# Patient Record
Sex: MALE | Race: WHITE
[De-identification: names, ages, dates, MRNs, and addresses within clinical notes are randomized per-mention and may not be internally consistent; named-entity substitution may affect disease eponyms.]

---

## 2020-09-06 ENCOUNTER — HOSPITAL ENCOUNTER (EMERGENCY)
Dept: HOSPITAL 75 - ER | Age: 35
Discharge: HOME | End: 2020-09-06
Payer: COMMERCIAL

## 2020-09-06 VITALS — DIASTOLIC BLOOD PRESSURE: 83 MMHG | SYSTOLIC BLOOD PRESSURE: 119 MMHG

## 2020-09-06 VITALS — HEIGHT: 71.65 IN | BODY MASS INDEX: 31.4 KG/M2 | WEIGHT: 229.28 LBS

## 2020-09-06 DIAGNOSIS — J30.2: Primary | ICD-10-CM

## 2020-09-06 PROCEDURE — 99284 EMERGENCY DEPT VISIT MOD MDM: CPT

## 2020-09-06 NOTE — ED RESPIRATORY
General


Chief Complaint:  Cough/Cold/Flu Symptoms


Stated Complaint:  COUGH / CONGESTION


Source:  patient


Exam Limitations:  no limitations





History of Present Illness


Date Seen by Provider:  Sep 6, 2020


Time Seen by Provider:  16:06


Initial Comments


the patient presents to the ER by private conveyance from home with chief 

complaint of upper airway congestion without discharge. He's been using Zyrtec 

and Flonase like normal without significant improvement in his symptoms. The 

humidity has been awful hard on his allergies he states. He has a history of 

asthma but has not been needing his albuterol inhaler any more than usual. He is

on Symbicort. He would like a steroid shot for his allergies. No fever chills 

diarrhea loss of sense of smell or taste.





Allergies and Home Medications


Allergies


Coded Allergies:  


     No Known Drug Allergies (Unverified , 9/6/20)





Home Medications


Albuterol 17 Gm Inh, 1 SPRAY IH UD, (Reported)


Promethazine/Codeine 5 Ml Syrp, 0 PO Q4H


   5 - 10 ML 


   Prescribed by: ROLANDA PIERSON on 12/25/12 1238





Patient Home Medication List


Home Medication List Reviewed:  Yes





Review of Systems


Review of Systems


Constitutional:  No chills, No fever


EENTM:  nose congestion; No ear discharge, No ear pain, No nose pain, No throat 

pain, No throat swelling


Respiratory:  No cough, No phlegm, No short of breath


Cardiovascular:  No chest pain, No palpitations


Gastrointestinal:  No abdominal pain, No melena, No nausea


Genitourinary:  No discharge, No dysuria


Musculoskeletal:  No back pain, No joint pain





All Other Systems Reviewed


Negative Unless Noted:  Yes





Past Medical-Social-Family Hx


Patient Social History


Alcohol Use:  Denies Use


Recreational Drug Use:  No


Type Used:  Smokeless Tobacco


Recent Foreign Travel:  No


Contact w/Someone Who Travel:  No





Physical Exam





Vital Signs - First Documented








 9/6/20





 16:16


 


O2 Delivery Room Air





Capillary Refill :


Height: '"


Weight: lbs. oz. kg;  BMI


Method:Stated


General Appearance:  WD/WN, no apparent distress


Eyes:  Bilateral Eye Normal Inspection, Bilateral Eye PERRL, Bilateral Eye EOMI


HEENT:  PERRL/EOMI, normal ENT inspection, TMs normal, pharynx normal, other (no

sinus tenderness. Blue tinted nasal mucosa. Oral pharynx without injection or 

erythema.)


Neck:  full range of motion, supple, normal inspection


Respiratory:  lungs clear, normal breath sounds, no respiratory distress, no 

accessory muscle use


Cardiovascular:  normal peripheral pulses, regular rate, rhythm


Neurologic/Psychiatric:  alert, normal mood/affect, oriented x 3


Skin:  normal color, warm/dry





Progress/Results/Core Measures


Suspected Sepsis


SIRS


Temperature: 


Pulse:  


Respiratory Rate: 


 


Blood Pressure  / 


Mean:





Results/Orders


My Orders





Orders - KRYSTAL FARIAS


Methylprednisolone Acetate Inj (Depo-Med (9/6/20 16:30)





Vital Signs/I&O











 9/6/20





 16:16


 


O2 Delivery Room Air





Capillary Refill :


Progress Note :  


   Time:  16:20


Progress Note


Aseptic vital signs with upper airway congestion likely allergic in nature. 

Depo-Medrol 40 mg IM times one.





Departure


Impression





   Primary Impression:  


   Allergic rhinitis


   Qualified Codes:  J30.2 - Other seasonal allergic rhinitis


Disposition:  01 HOME, SELF-CARE


Condition:  Stable





Departure-Patient Inst.


Decision time for Depature:  16:21


Referrals:  


NO,LOCAL PHYSICIAN (PCP/Family)


Primary Care Physician


Patient Instructions:  Seasonal Allergies (DC)





Add. Discharge Instructions:  


Continue your Zyrtec and Flonase.


Consider adding a decongestant such as Sudafed, chlorpheniramine or 

oxymetazoline/Afrin spray.


The steroid should kick in in about 12 hours and lasts for about 5-7 days.


It may cause increased feelings of energy and difficulty getting to sleep at 

night but this is temporary. You may use one to 2 tablets of Benadryl to help to

get to sleep at night if this occurs.


All discharge instructions reviewed with patient and/or family. Voiced 

understanding.











KRYSTAL FARIAS                  Sep 6, 2020 16:22

## 2021-01-10 ENCOUNTER — HOSPITAL ENCOUNTER (EMERGENCY)
Dept: HOSPITAL 75 - ER | Age: 36
Discharge: HOME | End: 2021-01-10
Payer: COMMERCIAL

## 2021-01-10 VITALS — SYSTOLIC BLOOD PRESSURE: 131 MMHG | DIASTOLIC BLOOD PRESSURE: 89 MMHG

## 2021-01-10 DIAGNOSIS — Z20.828: Primary | ICD-10-CM

## 2021-01-10 DIAGNOSIS — J45.909: ICD-10-CM

## 2021-01-10 PROCEDURE — 99283 EMERGENCY DEPT VISIT LOW MDM: CPT

## 2021-01-10 PROCEDURE — 71045 X-RAY EXAM CHEST 1 VIEW: CPT

## 2021-01-10 PROCEDURE — 87804 INFLUENZA ASSAY W/OPTIC: CPT

## 2021-01-10 PROCEDURE — 87635 SARS-COV-2 COVID-19 AMP PRB: CPT

## 2021-01-10 PROCEDURE — 87430 STREP A AG IA: CPT

## 2021-01-10 NOTE — DIAGNOSTIC IMAGING REPORT
INDICATION: Cough and sore throat.



EXAMINATION: Frontal chest was obtained at 8:21 p.m.



COMPARISON: 12/25/2012.



FINDINGS: Heart and mediastinal silhouette are normal in

appearance. The lungs are clear. There is no pneumothorax or

pleural fluid.



IMPRESSION: No acute process in the chest. 



Dictated by: 



  Dictated on workstation # [id number]

## 2021-01-10 NOTE — ED COUGH/URI
General


Stated Complaint:  COUGH,SORE THROAT


Source:  patient





History of Present Illness


Date Seen by Provider:  Filemon 10, 2021


Time Seen by Provider:  19:51


Initial Comments


PT ARRIVES VIA POV


C/O SORE THROAT


C/O NON-PRODUCTIVE COUGH--STATES IT IS GETTING DEEPER AND HE HAS HAD SOME 

WHEEZING


IS VAGUE ABOUT ONSET, BUT STATES SYMPTOMS WORSE SINCE YESTERDAY


NO KNOWN FEVER


NO ACTUAL SHORTNESS OF BREATH


NO LOSS OF TASTE OR SMELL


NO GI SYMPTOMS


NO HEADACHE


NO BODY ACHES


PT HAS HISTORY OF ASTHMA AND ALLERGIES--DOES NOT TAKE ANYTHING FOR SYMPTOMS. HAS

OLD ALBUTEROL INHALER, BUT HAS NOT USED IT 


HAS NOT TAKEN ANYTHING FOR SYMPTOMS AT ANY TIME





PT IS 


LIVES ALONE


NO KNOWN SICK CONTACTS OR KNOWN EXPOSURE TO COVID-19





PCP: NONE





Allergies and Home Medications


Allergies


Coded Allergies:  


     No Known Drug Allergies (Unverified , 9/6/20)





Home Medications


Albuterol 17 Gm Inh, 1 SPRAY IH UD, (Reported)


Azithromycin 500 Mg Tablet, 500 MG PO DAILY


   Prescribed by: LIZETTE GRIJALVA on 1/10/21 2100


Dexamethasone 6 Mg Tablet, 6 MG PO DAILY


   Prescribed by: LIZETTE GRIJALVA on 1/10/21 2100


Promethazine/Codeine 5 Ml Syrp, 0 PO Q4H


   5 - 10 ML 


   Prescribed by: ROLANDA PIERSON on 12/25/12 1238





Patient Home Medication List


Home Medication List Reviewed:  Yes





Review of Systems


Review of Systems


Constitutional:  no symptoms reported; No chills, No diaphoresis, No dizziness, 

No fever, No malaise, No weakness


EENTM:  see HPI, throat pain; No nose congestion


Respiratory:  see HPI, cough, wheezing


Cardiovascular:  no symptoms reported


Gastrointestinal:  no symptoms reported; No diarrhea, No loss of appetite, No 

nausea, No vomiting


Genitourinary:  no symptoms reported


Musculoskeletal:  no symptoms reported


Skin:  no symptoms reported


Psychiatric/Neurological:  No Symptoms Reported; Denies Headache


Hematologic/Lymphatic:  No Symptoms Reported


Immunological/Allergic:  no symptoms reported





Past Medical-Social-Family Hx


Past Med/Social Hx:  Reviewed and Corrections made


Patient Social History


Alcohol Use:  Occasionally Uses


Recreational Drug Use:  No


Type Used:  Smokeless Tobacco


Recent Foreign Travel:  No


Contact w/Someone Who Travel:  No


Recent Hopitalizations:  No





Seasonal Allergies


Seasonal Allergies:  Yes





Past Medical History


Surgeries:  Yes (hernia)


Abdominal, Orthopedic


Respiratory:  Yes


Asthma


Cardiac:  No


Neurological:  No


Genitourinary:  No


Gastrointestinal:  Yes


Abdominal Hernia


Musculoskeletal:  No


Endocrine:  No


HEENT:  No


Cancer:  No


Psychosocial:  No


Integumentary:  No


Blood Disorders:  No





Physical Exam





Vital Signs - First Documented








 1/10/21 1/10/21 1/10/21





 19:40 20:16 21:16


 


Temp 37.4  


 


Pulse 104  


 


Resp 16  


 


B/P (MAP) 145/109 (121)  


 


Pulse Ox   100


 


O2 Delivery  Room Air 





Capillary Refill :


Height: '"


Weight: lbs. oz. kg; 31.00 BMI


Method:Stated


General Appearance:  WD/WN, no apparent distress, other (DOES NOT APPEAR ILL OR 

TO BE IN ANY DISCOMFORT OR DISTRESS)


HEENT:  PERRL/EOMI, normal ENT inspection, TMs normal, pharynx normal, other 

(NOSE NORMAL. NO SINUS TENDERNESS)


Neck:  non-tender, full range of motion, supple, normal inspection; No 

lymphadenopathy (R), No lymphadenopathy (L)


Respiratory:  normal breath sounds, no respiratory distress, no accessory muscle

use


Cardiovascular:  regular rate, rhythm, no murmur


Gastrointestinal:  soft


Extremities:  normal inspection


Neurologic/Psychiatric:  CNs II-XII nml as tested, no motor/sensory deficits, 

alert, normal mood/affect, oriented x 3


Skin:  normal color, warm/dry





Progress/Results/Core Measures


Suspected Sepsis


SIRS


Temperature: 


Pulse:  


Respiratory Rate: 


 


Blood Pressure  / 


Mean:





Results/Orders


Lab Results





Laboratory Tests








Test


 1/10/21


20:05 Range/Units


 


 


Coronavirus 2019 (LEONIDAS) Negative  Negative  


 


Group A Streptococcus Screen NEGATIVE  NEGATIVE  








Micro Results





Microbiology


1/10/21 Influenza Types A,B Antigen (GISSELL) - Final, Complete


          





My Orders





Orders - LIZETTE GRIJALVA DO


Rapid Strep A Screen (1/10/21 19:49)


Coronavirus Sars-Cov-2 So 2019 (1/10/21 19:49)


Covid 19 Inhouse Test (1/10/21 19:49)


Influenza A And B Antigens (1/10/21 19:49)


Chest 1 View, Ap/Pa Only (1/10/21 19:51)


Dexamethasone Tablet (Decadron Tablet) (1/10/21 21:00)


Azithromycin Tablet (Zithromax Tablet) (1/10/21 21:00)


Albuterol/Ipratropium Inhaler (Combivent (1/10/21 21:00)


Rt Request For Service (1/10/21 20:55)





Medications Given in ED





Current Medications








 Medications  Dose


 Ordered  Sig/Jennifer


 Route  Start Time


 Stop Time Status Last Admin


Dose Admin


 


 Albuterol/


 Ipratropium  1 PUFF  QID  PRN


 IH  1/10/21 21:00


 1/10/21 21:17 DC 1/10/21 21:09


4 GM


 


 Azithromycin  500 mg  ONCE  ONCE


 PO  1/10/21 21:00


 1/10/21 21:01 DC 1/10/21 21:09


500 MG








Vital Signs/I&O











 1/10/21 1/10/21 1/10/21





 19:40 20:16 21:16


 


Temp 37.4  37.4


 


Pulse 104  98


 


Resp 16  16


 


B/P (MAP) 145/109 (121)  131/89 (121)


 


Pulse Ox   100


 


O2 Delivery  Room Air Room Air





Capillary Refill :


Progress Note :  


Progress Note


PLACED IN ISOLATION ROOM


PPE WORN AT ALL TIMES


COVID-19 TESTING PERFORMED


PT ADVISED OF NEED FOR QUARANTINE





Diagnostic Imaging





Comments


CXR--PER RADIOLOGIST REPORT AT 2100


FINDINGS: Heart and mediastinal silhouette are normal in


appearance. The lungs are clear. There is no pneumothorax or


pleural fluid.





IMPRESSION: No acute process in the chest.


   Reviewed:  Reviewed by Me





Departure


Impression





   Primary Impression:  


   Person under investigation for COVID-19


Disposition:  01 HOME, SELF-CARE


Condition:  Stable





Departure-Patient Inst.


Referrals:  


NO,LOCAL PHYSICIAN (PCP/Family)


Primary Care Physician


Patient Instructions:  Coronavirus Disease 2019 (COVID-19) (DC), Preventing the 

Spread of an Infectious Disease





Add. Discharge Instructions:  


LOTS OF CLEAR LIQUIDS--WATER, BROTH, JELLO, GATORADE





TYLENOL 1 GRAM/ MOTRIN 800 MG 4 TIMES A DAY AS NEEDED FOR PAIN OR FEVER





OVER THE COUNTER MEDICATIONS SUCH AS MUCINEX DM FOR COUGH





USE INHALER 2 PUFFS EVERY 4 HOURS AS NEEDED FOR BREATHING





FOLLOW UP WITH  OF CHOICE IN 4-5 DAYS IF NO BETTER--, RETURN TO ER IF WORSE


IF YOU ARE STILL HAVING SYMPTOMS, YOU MAY NEED TO BE RE-TESTED FOR COVID-19 AT 

THAT TIME





QUARANTINE YOURSELF AND ALL CLOSE CONTACTS FOR THE NEXT 2 WEEKS OR UNTIL CLEARED

BY  OR HEALTH DEPT


Scripts


Azithromycin (Zithromax) 500 Mg Tablet


500 MG PO DAILY for 5 Days, #5 TAB


   Prov: LIZETTE GRIJALVA DO         1/10/21 


Dexamethasone (Decadron) 6 Mg Tablet


6 MG PO DAILY, #10 TAB


   Prov: LIZETTE GRIJALVA DO         1/10/21


Work/School Note:  Local Medical Staff Listing, Work Release Form   Date Seen in

the Emergency Department:  Filemon 10, 2021


   Return to Work:  Jan 24, 2021


   Restrictions:  Need Release from Doctor











LIZETTE GRIJALVA DO                 Filemon 10, 2021 19:53

## 2022-01-02 ENCOUNTER — HOSPITAL ENCOUNTER (EMERGENCY)
Dept: HOSPITAL 75 - ER | Age: 37
Discharge: HOME | End: 2022-01-02
Payer: COMMERCIAL

## 2022-01-02 VITALS — DIASTOLIC BLOOD PRESSURE: 78 MMHG | SYSTOLIC BLOOD PRESSURE: 121 MMHG

## 2022-01-02 VITALS — BODY MASS INDEX: 34.47 KG/M2 | HEIGHT: 69.69 IN | WEIGHT: 238.1 LBS

## 2022-01-02 DIAGNOSIS — J45.909: Primary | ICD-10-CM

## 2022-01-02 DIAGNOSIS — Z20.822: ICD-10-CM

## 2022-01-02 LAB
ALBUMIN SERPL-MCNC: 4.3 GM/DL (ref 3.2–4.5)
ALP SERPL-CCNC: 57 U/L (ref 40–136)
ALT SERPL-CCNC: 29 U/L (ref 0–55)
BASOPHILS # BLD AUTO: 0 10^3/UL (ref 0–0.1)
BASOPHILS NFR BLD AUTO: 0 % (ref 0–10)
BILIRUB SERPL-MCNC: 0.7 MG/DL (ref 0.1–1)
BUN/CREAT SERPL: 10
CALCIUM SERPL-MCNC: 9.1 MG/DL (ref 8.5–10.1)
CHLORIDE SERPL-SCNC: 108 MMOL/L (ref 98–107)
CO2 SERPL-SCNC: 21 MMOL/L (ref 21–32)
CREAT SERPL-MCNC: 0.79 MG/DL (ref 0.6–1.3)
EOSINOPHIL # BLD AUTO: 0.1 10^3/UL (ref 0–0.3)
EOSINOPHIL NFR BLD AUTO: 1 % (ref 0–10)
GFR SERPLBLD BASED ON 1.73 SQ M-ARVRAT: 110 ML/MIN
GLUCOSE SERPL-MCNC: 98 MG/DL (ref 70–105)
HCT VFR BLD CALC: 52 % (ref 40–54)
HGB BLD-MCNC: 17.1 G/DL (ref 13.3–17.7)
LYMPHOCYTES # BLD AUTO: 2.6 10^3/UL (ref 1–4)
LYMPHOCYTES NFR BLD AUTO: 24 % (ref 12–44)
MANUAL DIFFERENTIAL PERFORMED BLD QL: NO
MCH RBC QN AUTO: 30 PG (ref 25–34)
MCHC RBC AUTO-ENTMCNC: 33 G/DL (ref 32–36)
MCV RBC AUTO: 90 FL (ref 80–99)
MONOCYTES # BLD AUTO: 0.8 10^3/UL (ref 0–1)
MONOCYTES NFR BLD AUTO: 7 % (ref 0–12)
NEUTROPHILS # BLD AUTO: 7.3 10^3/UL (ref 1.8–7.8)
NEUTROPHILS NFR BLD AUTO: 67 % (ref 42–75)
PLATELET # BLD: 310 10^3/UL (ref 130–400)
PMV BLD AUTO: 10.2 FL (ref 9–12.2)
POTASSIUM SERPL-SCNC: 3.9 MMOL/L (ref 3.6–5)
PROT SERPL-MCNC: 7.3 GM/DL (ref 6.4–8.2)
SODIUM SERPL-SCNC: 142 MMOL/L (ref 135–145)
WBC # BLD AUTO: 10.9 10^3/UL (ref 4.3–11)

## 2022-01-02 PROCEDURE — 86141 C-REACTIVE PROTEIN HS: CPT

## 2022-01-02 PROCEDURE — 87804 INFLUENZA ASSAY W/OPTIC: CPT

## 2022-01-02 PROCEDURE — 85025 COMPLETE CBC W/AUTO DIFF WBC: CPT

## 2022-01-02 PROCEDURE — 36415 COLL VENOUS BLD VENIPUNCTURE: CPT

## 2022-01-02 PROCEDURE — 80053 COMPREHEN METABOLIC PANEL: CPT

## 2022-01-02 PROCEDURE — 87636 SARSCOV2 & INF A&B AMP PRB: CPT

## 2022-01-02 PROCEDURE — 87635 SARS-COV-2 COVID-19 AMP PRB: CPT

## 2022-01-02 PROCEDURE — 71045 X-RAY EXAM CHEST 1 VIEW: CPT

## 2022-01-02 NOTE — ED COUGH/URI
General


Chief Complaint:  Cough/Cold/Flu Symptoms


Stated Complaint:  COUGH,CONGESTION,SORE THROAT


Nursing Triage Note:  


TO ROOM 10 WITH COMPLAINTS OF A LINGERING COUGH FOR 1.5 MONTHS.  HAS HAD A 


STEROID SHOT AND AMOXICILLIN PREVIOUSLY.  STATES HE TOOK A HOME COVID TEST TODAY




AND IT WAS NEG.





History of Present Illness


Date Seen by Provider:  2022


Time Seen by Provider:  15:45


Initial Comments


36-year-old  male presents for a cough that he has had for 

approximately the last month and a half.  He reports it has gotten worse over 

the last 10 to 12 hours.  He has been outside in the cold air.  He has a history

of asthma and has an albuterol inhaler.  He has not used it today.  He has been 

vaccinated for Covid.  He has not taken any over-the-counter cough medications.


Timing/Duration:  intermittent


Severity/Quality:  mild, dry cough


Prior Episodes/Possible Cause:  occasional episodes


Modifying Factors:  Improves With Albuterol Inhaler


Associated Symptoms:  cough





Allergies and Home Medications


Allergies


Coded Allergies:  


     No Known Drug Allergies (Unverified , 20)





Patient Home Medication List


Home Medication List Reviewed:  Yes


Benzonatate (Tessalon Perles) 100 Mg Capsule, 100 MG PO Q8H PRN for COUGH


   Prescribed by: JOSE CARLSON on 22


Discontinued Medications


Albuterol (Proventil) 17 Gm Inh, 1 SPRAY IH UD, (Reported)


   Discontinued Reason: No Longer Taking


   Entered as Reported by: PEDRO LUIS SPANN on 12 1055


   Last Action: Discontinued


Azithromycin (Zithromax) 500 Mg Tablet, 500 MG PO DAILY


   Discontinued Reason: No Longer Taking


   Prescribed by: LIZETTE GRIJALVA on 1/10/21 2100


   Last Action: Discontinued


Dexamethasone (Decadron) 6 Mg Tablet, 6 MG PO DAILY


   Discontinued Reason: No Longer Taking


   Prescribed by: LIZETTE GRIJALVA on 1/10/21 2100


   Last Action: Discontinued


Promethazine/Codeine (Phenergan W/Codeine Syrup) 5 Ml Syrp, 0 PO Q4H


   Discontinued Reason: No Longer Taking


   Prescribed by: ROLANDA PIERSON on 12 1238


   Last Action: Discontinued





Review of Systems


Review of Systems


Constitutional:  no symptoms reported, see HPI; No fever, No malaise, No 

weakness


EENTM:  see HPI, no symptoms reported


Respiratory:  see HPI, cough; No short of breath


Cardiovascular:  no symptoms reported, see HPI


Gastrointestinal:  no symptoms reported, see HPI





All Other Systems Reviewed


Negative Unless Noted:  Yes





Past Medical-Social-Family Hx


Immunizations Up To Date


Second COVID19 Vaccination [place]:  


COVID19 Vaccine :  MODERNA





Seasonal Allergies


Seasonal Allergies:  Yes





Past Medical History


Surgeries:  Yes (hernia)


Abdominal, Orthopedic


Respiratory:  Yes


Asthma


Cardiac:  No


Neurological:  No


Genitourinary:  No


Gastrointestinal:  Yes


Abdominal Hernia


Musculoskeletal:  No


Endocrine:  No


HEENT:  No


Cancer:  No


Psychosocial:  No


Integumentary:  No


Blood Disorders:  No





Family Medical History


Reviewed Nursing Family Hx





Physical Exam





Vital Signs - First Documented








 22





 15:40


 


Temp 36.6


 


Pulse 106


 


Resp 16


 


B/P (MAP) 124/93 (103)


 


Pulse Ox 96


 


O2 Delivery Room Air





Capillary Refill : Less Than 3 Seconds


Height: '"


Weight: lbs. oz. kg; 34.00 BMI


Method:Stated


General Appearance:  WD/WN, no apparent distress


HEENT:  PERRL/EOMI, normal ENT inspection, TMs normal, pharynx normal


Neck:  non-tender, full range of motion, supple, normal inspection


Respiratory:  chest non-tender, lungs clear, normal breath sounds, no 

respiratory distress


Cardiovascular:  normal peripheral pulses, regular rate, rhythm


Gastrointestinal:  normal bowel sounds, non tender, soft


Neurologic/Psychiatric:  no motor/sensory deficits, alert, normal mood/affect, 

oriented x 3


Skin:  normal color, warm/dry





Progress/Results/Core Measures


Suspected Sepsis


SIRS


Temperature: 


Pulse: 106 


Respiratory Rate: 16


 


Laboratory Tests


22 17:00: White Blood Count 10.9


Blood Pressure 124 /93 


Mean: 103


 





Laboratory Tests


22 17:00: 


Creatinine 0.79, Platelet Count 310, Total Bilirubin 0.7








Results/Orders


Lab Results





Laboratory Tests








Test


 22


15:45 22


17:00 Range/Units


 


 


Influenza Type A Antigen NEGATIVE   NEGATIVE  


 


Influenza Type B Antigen NEGATIVE   NEGATIVE  


 


SARS-CoV-2 RNA (RT-PCR) Not Detected   Negative  


 


White Blood Count


 


 10.9 


 4.3-11.0


10^3/uL


 


Red Blood Count


 


 5.75 H


 4.30-5.52


10^6/uL


 


Hemoglobin  17.1  13.3-17.7  g/dL


 


Hematocrit  52  40-54  %


 


Mean Corpuscular Volume  90  80-99  fL


 


Mean Corpuscular Hemoglobin  30  25-34  pg


 


Mean Corpuscular Hemoglobin


Concent 


 33 


 32-36  g/dL





 


Red Cell Distribution Width  12.6  10.0-14.5  %


 


Platelet Count


 


 310 


 130-400


10^3/uL


 


Mean Platelet Volume  10.2  9.0-12.2  fL


 


Immature Granulocyte % (Auto)  0   %


 


Neutrophils (%) (Auto)  67  42-75  %


 


Lymphocytes (%) (Auto)  24  12-44  %


 


Monocytes (%) (Auto)  7  0-12  %


 


Eosinophils (%) (Auto)  1  0-10  %


 


Basophils (%) (Auto)  0  0-10  %


 


Neutrophils # (Auto)


 


 7.3 


 1.8-7.8


10^3/uL


 


Lymphocytes # (Auto)


 


 2.6 


 1.0-4.0


10^3/uL


 


Monocytes # (Auto)


 


 0.8 


 0.0-1.0


10^3/uL


 


Eosinophils # (Auto)


 


 0.1 


 0.0-0.3


10^3/uL


 


Basophils # (Auto)


 


 0.0 


 0.0-0.1


10^3/uL


 


Immature Granulocyte # (Auto)


 


 0.0 


 0.0-0.1


10^3/uL


 


Sodium Level  142  135-145  MMOL/L


 


Potassium Level  3.9  3.6-5.0  MMOL/L


 


Chloride Level  108 H   MMOL/L


 


Carbon Dioxide Level  21  21-32  MMOL/L


 


Anion Gap  13  5-14  MMOL/L


 


Blood Urea Nitrogen  8  7-18  MG/DL


 


Creatinine


 


 0.79 


 0.60-1.30


MG/DL


 


Estimat Glomerular Filtration


Rate 


 110 


  





 


BUN/Creatinine Ratio  10   


 


Glucose Level  98    MG/DL


 


Calcium Level  9.1  8.5-10.1  MG/DL


 


Corrected Calcium  8.9  8.5-10.1  MG/DL


 


Total Bilirubin  0.7  0.1-1.0  MG/DL


 


Aspartate Amino Transf


(AST/SGOT) 


 18 


 5-34  U/L





 


Alanine Aminotransferase


(ALT/SGPT) 


 29 


 0-55  U/L





 


Alkaline Phosphatase  57    U/L


 


C-Reactive Protein High


Sensitivity 


 0.09 


 0.00-0.50


MG/DL


 


Total Protein  7.3  6.4-8.2  GM/DL


 


Albumin  4.3  3.2-4.5  GM/DL








My Orders





Orders - ROBYN,JOSE ARNP


Chest 1 View, Ap/Pa Only (22 16:42)


Benzonatate Capsule (Tessalon Perles) (22 16:42)


Rx-Albuterol Inhaler (Rx-Ventolin Hfa In (22 16:42)


Covid 19 Inhouse Test (22 16:43)


Influenza A & B Antigens (22 16:43)


Cbc With Automated Diff (22 16:43)


Comprehensive Metabolic Panel (22 16:43)


Hs C Reactive Protein (22 16:43)


Coronavirus Sars-Cov-2 So  (22 15:45)





Vital Signs/I&O











 22





 15:40 18:08


 


Temp 36.6 


 


Pulse 106 100


 


Resp 16 16


 


B/P (MAP) 124/93 (103) 121/78


 


Pulse Ox 96 97


 


O2 Delivery Room Air Room Air





Capillary Refill : Less Than 3 Seconds








Blood Pressure Mean:                    103








Progress Note :  


   Time:  15:45


Progress Note


Patient seen and evaluated, with persistent symptoms that are worsening today we

will go ahead and repeat the Covid test and get labs and chest x-ray.  Ventolin 

inhaler 2 puffs and benzonatate for the cough.


1645 Covid and influenza swabs negative, chest x-ray shows no acute findings.  


1730 Patient reports improvement after taking the Tessalon and using the 

Ventolin.  Discharge instructions and return precautions reviewed.





Diagnostic Imaging





   Diagonstic Imaging:  Xray


   Plain Films/CT/US/NM/MRI:  chest


Comments


NAME:   PREET BERNARD


MED REC#:   [id number]


ACCOUNT#:   [id number]


PT STATUS:   REG ER


:   1985


PHYSICIAN:   JOSE CARLSON


ADMIT DATE:   22/ER


                                   ***Draft***


Date of Exam:22





CHEST 1 VIEW, AP/PA ONLY








EXAM: Chest 1 view, AP/PA only.





INDICATION: Cough times 1-1/2 months.





COMPARISON: 01/10/2021.





FINDINGS: Normal heart size and central pulmonary vascularity.


Low lung volumes with mild perihilar atelectasis. Lungs are


otherwise clear. No pleural effusion or pneumothorax. No acute


osseous findings. Left costophrenic angle is not entirely


included on the field-of-view





IMPRESSION: 


1. Low lung volumes with mild perihilar atelectasis.


2. The left costophrenic angle is not included on the


field-of-view. 





  Dictated on workstation # [id number]








Dict:   22 [id number]


Trans:   22 1724


[facility] [id number]





Interpreted by:     DENIA UMANA MD


Electronically signed by:


   Reviewed:  Reviewed by Me





Departure


Impression





   Primary Impression:  


   Cough


   Additional Impression:  


   Asthma


   Qualified Codes:  J45.40 - Moderate persistent asthma, uncomplicated


Disposition:   HOME, SELF-CARE


Condition:  Improved





Departure-Patient Inst.


Decision time for Depature:  17:30


Referrals:  


NO,LOCAL PHYSICIAN (PCP/Family)


Primary Care Physician


Patient Instructions:  Asthma, Adult (DC), Cough, Adult (DC)





Add. Discharge Instructions:  


Use your albuterol inhaler 2 puffs every 4 hours as needed.


Use the benzonatate for cough.


Follow-up with your primary care provider if symptoms are not improving or 

worsen.


Increase water intake, 16 ounces every 2 hours while awake.


Return to the emergency department for new, urgent healthcare needs.





All discharge instructions reviewed with patient and/or family. Voiced 

understanding.


Scripts


Benzonatate (TESSALON PERLES) 100 Mg Capsule


100 MG PO Q8H PRN for COUGH, #20 CAP 0 Refills


   Prov: JOSE CARLSON         22





Copy


Copies To 1:   SERGE RAMÍREZ MD, AMY ARNP                   2022 15:58

## 2022-01-02 NOTE — DIAGNOSTIC IMAGING REPORT
EXAM: Chest 1 view, AP/PA only.



INDICATION: Cough times 1-1/2 months.



COMPARISON: 01/10/2021.



FINDINGS: Normal heart size and central pulmonary vascularity.

Low lung volumes with mild perihilar atelectasis. Lungs are

otherwise clear. No pleural effusion or pneumothorax. No acute

osseous findings. Left costophrenic angle is not entirely

included on the field-of-view



IMPRESSION: 

1. Low lung volumes with mild perihilar atelectasis.

2. The left costophrenic angle is not included on the

field-of-view. 



Dictated by: 



  Dictated on workstation # [id number]